# Patient Record
Sex: MALE | Race: WHITE
[De-identification: names, ages, dates, MRNs, and addresses within clinical notes are randomized per-mention and may not be internally consistent; named-entity substitution may affect disease eponyms.]

---

## 2022-06-06 ENCOUNTER — HOSPITAL ENCOUNTER (EMERGENCY)
Dept: HOSPITAL 94 - ER | Age: 42
Discharge: HOME | End: 2022-06-06
Payer: COMMERCIAL

## 2022-06-06 VITALS — BODY MASS INDEX: 41.75 KG/M2 | HEIGHT: 73 IN | WEIGHT: 315 LBS

## 2022-06-06 VITALS — DIASTOLIC BLOOD PRESSURE: 94 MMHG | SYSTOLIC BLOOD PRESSURE: 142 MMHG

## 2022-06-06 DIAGNOSIS — S82.831A: Primary | ICD-10-CM
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
## 2022-06-06 DIAGNOSIS — T81.33XA: ICD-10-CM

## 2022-06-06 PROCEDURE — 29515 APPLICATION SHORT LEG SPLINT: CPT

## 2022-06-06 PROCEDURE — 99283 EMERGENCY DEPT VISIT LOW MDM: CPT

## 2022-06-06 PROCEDURE — 73610 X-RAY EXAM OF ANKLE: CPT

## 2022-06-15 ENCOUNTER — HOSPITAL ENCOUNTER (OUTPATIENT)
Dept: HOSPITAL 94 - PAS | Age: 42
Discharge: HOME | End: 2022-06-15
Attending: STUDENT IN AN ORGANIZED HEALTH CARE EDUCATION/TRAINING PROGRAM
Payer: COMMERCIAL

## 2022-06-15 VITALS — DIASTOLIC BLOOD PRESSURE: 71 MMHG | SYSTOLIC BLOOD PRESSURE: 119 MMHG

## 2022-06-15 VITALS — SYSTOLIC BLOOD PRESSURE: 120 MMHG | DIASTOLIC BLOOD PRESSURE: 75 MMHG

## 2022-06-15 VITALS — DIASTOLIC BLOOD PRESSURE: 75 MMHG | SYSTOLIC BLOOD PRESSURE: 117 MMHG

## 2022-06-15 VITALS — DIASTOLIC BLOOD PRESSURE: 78 MMHG | SYSTOLIC BLOOD PRESSURE: 117 MMHG

## 2022-06-15 VITALS — DIASTOLIC BLOOD PRESSURE: 75 MMHG | SYSTOLIC BLOOD PRESSURE: 130 MMHG

## 2022-06-15 VITALS — SYSTOLIC BLOOD PRESSURE: 132 MMHG | DIASTOLIC BLOOD PRESSURE: 80 MMHG

## 2022-06-15 VITALS — SYSTOLIC BLOOD PRESSURE: 146 MMHG | DIASTOLIC BLOOD PRESSURE: 87 MMHG

## 2022-06-15 VITALS — DIASTOLIC BLOOD PRESSURE: 55 MMHG | SYSTOLIC BLOOD PRESSURE: 114 MMHG

## 2022-06-15 VITALS — DIASTOLIC BLOOD PRESSURE: 76 MMHG | SYSTOLIC BLOOD PRESSURE: 129 MMHG

## 2022-06-15 VITALS — BODY MASS INDEX: 41.75 KG/M2 | HEIGHT: 73 IN | WEIGHT: 315 LBS

## 2022-06-15 VITALS — SYSTOLIC BLOOD PRESSURE: 125 MMHG | DIASTOLIC BLOOD PRESSURE: 73 MMHG
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
## 2022-06-15 DIAGNOSIS — G89.18: ICD-10-CM
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
## 2022-06-15 DIAGNOSIS — Z79.82: ICD-10-CM
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
## 2022-06-15 DIAGNOSIS — Z98.890: ICD-10-CM

## 2022-06-15 DIAGNOSIS — S93.421A: ICD-10-CM

## 2022-06-15 DIAGNOSIS — Z79.899: ICD-10-CM

## 2022-06-15 DIAGNOSIS — Z87.891: ICD-10-CM

## 2022-06-15 DIAGNOSIS — M25.371: ICD-10-CM

## 2022-06-15 DIAGNOSIS — S82.841A: Primary | ICD-10-CM

## 2022-06-15 DIAGNOSIS — E66.01: ICD-10-CM

## 2022-06-15 LAB
ALBUMIN SERPL BCP-MCNC: 3.4 G/DL (ref 3.4–5)
ALBUMIN/GLOB SERPL: 0.8 {RATIO} (ref 1.1–1.5)
ALP SERPL-CCNC: 103 IU/L (ref 46–116)
ALT SERPL W P-5'-P-CCNC: 44 U/L (ref 30–65)
ANION GAP SERPL CALCULATED.3IONS-SCNC: 10 MMOL/L (ref 8–16)
AST SERPL W P-5'-P-CCNC: 41 U/L (ref 10–37)
BASOPHILS # BLD AUTO: 0.1 X10'3 (ref 0–0.2)
BASOPHILS NFR BLD AUTO: 0.8 % (ref 0–1)
BILIRUB SERPL-MCNC: 0.8 MG/DL (ref 0–1)
BUN SERPL-MCNC: 14 MG/DL (ref 7–18)
BUN/CREAT SERPL: 21.9 (ref 5.4–32)
CALCIUM SERPL-MCNC: 9 MG/DL (ref 8.5–10.1)
CHLORIDE SERPL-SCNC: 103 MMOL/L (ref 99–107)
CO2 SERPL-SCNC: 23.6 MMOL/L (ref 24–32)
CREAT SERPL-MCNC: 0.64 MG/DL (ref 0.6–1.1)
EOSINOPHIL # BLD AUTO: 0.2 X10'3 (ref 0–0.9)
EOSINOPHIL NFR BLD AUTO: 2.5 % (ref 0–6)
ERYTHROCYTE [DISTWIDTH] IN BLOOD BY AUTOMATED COUNT: 13.7 % (ref 11.5–14.5)
GFR SERPL CREATININE-BSD FRML MDRD: > 90 ML/MIN
GLUCOSE SERPL-MCNC: 98 MG/DL (ref 70–104)
HCT VFR BLD AUTO: 40.8 % (ref 42–52)
HGB BLD-MCNC: 13.7 G/DL (ref 14–17.9)
LYMPHOCYTES # BLD AUTO: 1.9 X10'3 (ref 1.1–4.8)
LYMPHOCYTES NFR BLD AUTO: 23.4 % (ref 21–51)
MCH RBC QN AUTO: 30.6 PG (ref 27–31)
MCHC RBC AUTO-ENTMCNC: 33.7 G/DL (ref 33–36.5)
MCV RBC AUTO: 90.9 FL (ref 78–98)
MONOCYTES # BLD AUTO: 0.5 X10'3 (ref 0–0.9)
MONOCYTES NFR BLD AUTO: 5.9 % (ref 2–12)
NEUTROPHILS # BLD AUTO: 5.4 X10'3 (ref 1.8–7.7)
NEUTROPHILS NFR BLD AUTO: 67.4 % (ref 42–75)
PLATELET # BLD AUTO: 254 X10'3 (ref 140–440)
PMV BLD AUTO: 8 FL (ref 7.4–10.4)
POTASSIUM SERPL-SCNC: 4.3 MMOL/L (ref 3.4–5.1)
PROT SERPL-MCNC: 7.7 G/DL (ref 6.4–8.2)
RBC # BLD AUTO: 4.49 X10'6 (ref 4.7–6.1)
SODIUM SERPL-SCNC: 137 MMOL/L (ref 135–145)

## 2022-06-15 PROCEDURE — 82948 REAGENT STRIP/BLOOD GLUCOSE: CPT

## 2022-06-15 PROCEDURE — 27695 REPAIR OF ANKLE LIGAMENT: CPT

## 2022-06-15 PROCEDURE — 76942 ECHO GUIDE FOR BIOPSY: CPT

## 2022-06-15 PROCEDURE — 73600 X-RAY EXAM OF ANKLE: CPT

## 2022-06-15 PROCEDURE — 85025 COMPLETE CBC W/AUTO DIFF WBC: CPT

## 2022-06-15 PROCEDURE — 27829 TREAT LOWER LEG JOINT: CPT

## 2022-06-15 PROCEDURE — 64445 NJX AA&/STRD SCIATIC NRV IMG: CPT

## 2022-06-15 PROCEDURE — 64447 NJX AA&/STRD FEMORAL NRV IMG: CPT

## 2022-06-15 PROCEDURE — 27814 TREATMENT OF ANKLE FRACTURE: CPT

## 2022-06-15 PROCEDURE — 93005 ELECTROCARDIOGRAM TRACING: CPT

## 2022-06-15 PROCEDURE — 80053 COMPREHEN METABOLIC PANEL: CPT

## 2022-06-15 PROCEDURE — 36415 COLL VENOUS BLD VENIPUNCTURE: CPT

## 2022-06-15 PROCEDURE — 76000 FLUOROSCOPY <1 HR PHYS/QHP: CPT

## 2022-06-15 NOTE — NUR
ALL DISCHARGE CRITERIA HAS BEEN MET. VSS, PAIN AT A TOLERABLE LEVEL, VOIDING AND ABLE TO 
SAFELY AMBULATE AND TRANSFER SELF. IV TAKEN OUT WITHOUT ANY COMPLICATIONS. ALL DISCHARGE 
INSTRUCTIONS COVERED WITH PATIENT AND ALL QUESTIONS ANSWERED. PATIENT TAKEN OUT VIA 
WHEELCHAIR TO PERSONAL VEHICLE WHERE FAMILY/FRIEND DROVE PATIENT HOME. 

-------------------------------------------------------------------------------

Addendum: 06/15/22 at 1647 by Jun Elizabeth RN, RN

-------------------------------------------------------------------------------

Amended: Links added.

## 2022-06-15 NOTE — NUR
Received from OR via [facility] , accompanied by Anesthesiologist LAUREANO and report given by 
Anesthesiolgist. PATIENT WITH 20G PIV IN LEFT HAND RUNNING LR . DENIES PAIN AT THIS 
TIME RIGHT LE ELEVATED UPON ARRIVAL- CAST ON AND IN PLACE. PATIENT WITH + CAP REFILL AND 
TOES ARE PWD, SPLINT IN PLACE AT THIS TIME.


-------------------------------------------------------------------------------

Addendum: 06/15/22 at 1543 by Jun Elizabeth RN, RN

-------------------------------------------------------------------------------

Amended: Links added.

## 2025-04-10 ENCOUNTER — HOSPITAL ENCOUNTER (EMERGENCY)
Dept: HOSPITAL 94 - ER | Age: 45
Discharge: HOME | End: 2025-04-10
Payer: COMMERCIAL

## 2025-04-10 VITALS
DIASTOLIC BLOOD PRESSURE: 75 MMHG | RESPIRATION RATE: 15 BRPM | OXYGEN SATURATION: 98 % | HEART RATE: 89 BPM | SYSTOLIC BLOOD PRESSURE: 171 MMHG

## 2025-04-10 VITALS — BODY MASS INDEX: 41.1 KG/M2 | WEIGHT: 310.08 LBS | HEIGHT: 73 IN

## 2025-04-10 VITALS — TEMPERATURE: 98.1 F

## 2025-04-10 DIAGNOSIS — Z79.899: ICD-10-CM

## 2025-04-10 DIAGNOSIS — M10.9: ICD-10-CM

## 2025-04-10 DIAGNOSIS — M25.562: Primary | ICD-10-CM

## 2025-04-10 PROCEDURE — 99283 EMERGENCY DEPT VISIT LOW MDM: CPT

## 2025-04-10 PROCEDURE — 73564 X-RAY EXAM KNEE 4 OR MORE: CPT

## 2025-04-10 PROCEDURE — 96372 THER/PROPH/DIAG INJ SC/IM: CPT

## 2025-04-10 RX ADMIN — KETOROLAC TROMETHAMINE STA MG: 30 INJECTION, SOLUTION INTRAMUSCULAR at 15:51
